# Patient Record
Sex: FEMALE | Race: BLACK OR AFRICAN AMERICAN | NOT HISPANIC OR LATINO | Employment: FULL TIME | URBAN - NONMETROPOLITAN AREA
[De-identification: names, ages, dates, MRNs, and addresses within clinical notes are randomized per-mention and may not be internally consistent; named-entity substitution may affect disease eponyms.]

---

## 2024-08-13 ENCOUNTER — HOSPITAL ENCOUNTER (OUTPATIENT)
Dept: GENERAL RADIOLOGY | Facility: HOSPITAL | Age: 39
Discharge: HOME | End: 2024-08-13
Admitting: NURSE PRACTITIONER
Payer: COMMERCIAL

## 2024-08-13 ENCOUNTER — OFFICE VISIT (OUTPATIENT)
Dept: UROLOGY | Facility: CLINIC | Age: 39
End: 2024-08-13
Payer: COMMERCIAL

## 2024-08-13 VITALS
WEIGHT: 134.6 LBS | DIASTOLIC BLOOD PRESSURE: 85 MMHG | HEIGHT: 64 IN | BODY MASS INDEX: 22.98 KG/M2 | HEART RATE: 98 BPM | SYSTOLIC BLOOD PRESSURE: 123 MMHG

## 2024-08-13 DIAGNOSIS — N20.1 LEFT URETERAL STONE: Primary | ICD-10-CM

## 2024-08-13 DIAGNOSIS — R10.9 LEFT FLANK PAIN: ICD-10-CM

## 2024-08-13 DIAGNOSIS — R35.0 FREQUENCY OF MICTURITION: ICD-10-CM

## 2024-08-13 DIAGNOSIS — R10.30 LOWER ABDOMINAL PAIN: ICD-10-CM

## 2024-08-13 DIAGNOSIS — N30.01 ACUTE CYSTITIS WITH HEMATURIA: ICD-10-CM

## 2024-08-13 LAB
BILIRUB BLD-MCNC: NEGATIVE MG/DL
CLARITY, POC: CLEAR
COLOR UR: YELLOW
EXPIRATION DATE: ABNORMAL
GLUCOSE UR STRIP-MCNC: NEGATIVE MG/DL
KETONES UR QL: NEGATIVE
LEUKOCYTE EST, POC: NEGATIVE
Lab: ABNORMAL
NITRITE UR-MCNC: NEGATIVE MG/ML
PH UR: 5.5 [PH] (ref 5–8)
PROT UR STRIP-MCNC: NEGATIVE MG/DL
RBC # UR STRIP: ABNORMAL /UL
SP GR UR: 1.01 (ref 1–1.03)
UROBILINOGEN UR QL: NORMAL

## 2024-08-13 PROCEDURE — 74018 RADEX ABDOMEN 1 VIEW: CPT | Performed by: RADIOLOGY

## 2024-08-13 PROCEDURE — 74018 RADEX ABDOMEN 1 VIEW: CPT

## 2024-08-13 PROCEDURE — 81003 URINALYSIS AUTO W/O SCOPE: CPT | Performed by: NURSE PRACTITIONER

## 2024-08-13 PROCEDURE — 99204 OFFICE O/P NEW MOD 45 MIN: CPT | Performed by: NURSE PRACTITIONER

## 2024-08-13 RX ORDER — NITROFURANTOIN 25; 75 MG/1; MG/1
100 CAPSULE ORAL 2 TIMES DAILY
Qty: 14 CAPSULE | Refills: 0 | Status: SHIPPED | OUTPATIENT
Start: 2024-08-13

## 2024-08-13 RX ORDER — KETOROLAC TROMETHAMINE 10 MG/1
10 TABLET, FILM COATED ORAL EVERY 6 HOURS PRN
Qty: 20 TABLET | Refills: 0 | Status: SHIPPED | OUTPATIENT
Start: 2024-08-13 | End: 2024-08-18

## 2024-08-13 RX ORDER — CEPHALEXIN 500 MG/1
1000 CAPSULE ORAL 4 TIMES DAILY
COMMUNITY
Start: 2024-08-09 | End: 2024-08-19

## 2024-08-13 RX ORDER — KETOROLAC TROMETHAMINE 10 MG/1
10 TABLET, FILM COATED ORAL EVERY 6 HOURS PRN
COMMUNITY
Start: 2024-08-09 | End: 2024-08-13 | Stop reason: SDUPTHER

## 2024-08-13 RX ORDER — PHENAZOPYRIDINE HYDROCHLORIDE 200 MG/1
200 TABLET, FILM COATED ORAL 3 TIMES DAILY PRN
Qty: 20 TABLET | Refills: 0 | Status: SHIPPED | OUTPATIENT
Start: 2024-08-13

## 2024-08-13 RX ORDER — TAMSULOSIN HYDROCHLORIDE 0.4 MG/1
0.4 CAPSULE ORAL DAILY
COMMUNITY
Start: 2024-08-09 | End: 2024-08-19

## 2024-08-13 NOTE — PROGRESS NOTES
Chief Complaint  Renal Stones (NEW PATIENT WITH LEFT URETERAL STONE/FLANK PAIN/ACUTE CYSTITIS)    Subjective          Brittany [place] presents to [facility] for  LEFT URETERAL STONE/FLANK PAIN/ACUTE CYSTITIS  History of Present Illness   History of Present Illness  The patient is a pleasant 39-year-old female with a significant history of recurrent kidney stones who presents to clinic today for evaluation as a new patient consult. The patient is also an ER follow-up for persistent left ureteral stone, acute cystitis with hematuria, flank and abdominal pain.     Patient reports she has had numerous ER visits dating from 06/17/2024 and most recently on 08/09/2024 presenting with left greater than right sided flank pain. The patient describes her pain as colliding in nature, 10/10. It had been intermittent initially but progressed to sharp, constant pain radiating to her right lower back, right lower quadrant abdomen causing significant pelvic pressure, suprapubic discomfort, urinary symptoms, frequency, urgency, and dysuria.  She is currently on antibiotic post ER visit, urinalysis is negative for leukocyte esterase, it is negative for nitrites, it is negative for gross but showed 3+ microscopic hematuria.    She had a recent CT completed 08/9 which I have reviewed showing A 4 mm distal left ureteral calculus, nearly to the left UVJ, causing mild upstream Hydroureteronephrosis.     On clinic evaluation today, she is still in apparent discomfort. However, the patient thinks she has passed a stone. She reports feeling better since she passed another stone on Friday. She was given 2 bags of fluid in the ER and urinated before leaving the ER. She is unsure if she passed the stone as she is not experiencing any pain. She has not seen any particles in her urine.     She denies any burning during urination, frequency, or urgency. She denies any nausea or vomiting. She is taking  "Flomax for frequent urination and urgency. She has had 3 kidney stones since 07/04/2024. The first stone on 07/04/2024 was 4mm, and she passed black particles on Friday. She passed a 3mm stone 4 weeks after that. She visited [facility] for 2 Mondays, the first time was 3 weeks after the 4th, and she was told she had a severe UTI going into her kidneys. She had a kidney stone in her right kidney 4 years ago. The ER doctor told her that the minerals in her water could have been the kidney stone. She has started drinking hot chocolate, but not every day like she used to. She drinks Sprite, lemonade, and cranberry juice. She has not eaten steak since 07/2024. She has not drank apple juice in 4 years.    FINDINGS: KUB 08/14/2024  1. View of the abdomen SHOW Moderate stool burden No evidence of bowel obstruction   IMPRESSION:Moderate stool burden.  Calcifications in the left hemipelvis    CT ABDOMEN/PELVIS 08/14/24      1.   A 4 mm distal left ureteral calculus, nearly to the left UVJ, causing mild upstream left hydroureteronephrosis.        2.   Nonspecific left gluteal subcutaneous soft tissue density, measuring 4.3 x 2.0 cm, with adjacent small foci of subcutaneous emphysema. Diagnostic consideration would include a hematoma or contusion if there is any recent clinical history of trauma. Consider follow-up to document resolution to exclude other etiologies   Active Ambulatory Problems     Diagnosis Date Noted    Left ureteral stone 08/13/2024    Left flank pain 08/13/2024    Frequency of micturition 08/13/2024    Lower abdominal pain 08/13/2024    Acute cystitis with hematuria 08/13/2024     Resolved Ambulatory Problems     Diagnosis Date Noted    No Resolved Ambulatory Problems     No Additional Past Medical History      Objective   Vital Signs:   /85 (BP Location: Left arm, Patient Position: Sitting, Cuff Size: Adult)   Pulse 98   Ht 162.6 cm (64\")   Wt 61.1 kg (134 lb 9.6 oz)   BMI 23.10 kg/m²       ROS: "   Review of Systems   Constitutional:  Positive for activity change, appetite change, fatigue and unexpected weight loss. Negative for chills, diaphoresis, fever and unexpected weight gain.   HENT: Negative.  Negative for congestion.    Eyes: Negative.  Negative for blurred vision, double vision, photophobia, pain, redness and visual disturbance.   Respiratory: Negative.  Negative for apnea, cough, chest tightness, shortness of breath and wheezing.    Cardiovascular: Negative.    Gastrointestinal:  Positive for abdominal pain, constipation and nausea. Negative for abdominal distention, diarrhea and vomiting.   Endocrine: Negative.  Negative for polydipsia, polyphagia and polyuria.   Genitourinary:  Positive for decreased urine volume, dysuria, flank pain, frequency, hematuria, pelvic pain, pelvic pressure and urgency. Negative for difficulty urinating, dyspareunia, genital sores, urinary incontinence and vaginal discharge.   Musculoskeletal:  Positive for myalgias. Negative for arthralgias, back pain and joint swelling.   Skin:  Positive for dry skin and pallor. Negative for rash and wound.   Allergic/Immunologic: Negative.    Neurological: Negative.  Negative for dizziness, headache, memory problem and confusion.   Hematological: Negative.    Psychiatric/Behavioral:  Positive for sleep disturbance and stress. Negative for behavioral problems and decreased concentration.         Physical Exam  Constitutional:       General: She is in acute distress.      Appearance: She is well-developed.   HENT:      Head: Normocephalic and atraumatic.   Eyes:      Pupils: Pupils are equal, round, and reactive to light.   Neck:      Thyroid: No thyromegaly.      Trachea: No tracheal deviation.   Cardiovascular:      Rate and Rhythm: Normal rate and regular rhythm.      Heart sounds: No murmur heard.  Pulmonary:      Effort: Pulmonary effort is normal. No respiratory distress.      Breath sounds: Normal breath sounds. No stridor. No  wheezing.   Abdominal:      General: Bowel sounds are normal.      Palpations: Abdomen is soft.      Tenderness: There is abdominal tenderness.   Genitourinary:     Labia:         Right: No tenderness.         Left: No tenderness.       Vagina: Normal. No vaginal discharge.      Comments: URINE FREQUENCY/MICROHEMATURIA  Musculoskeletal:         General: Tenderness (LEFT FLANK PAIN) present. No deformity. Normal range of motion.      Cervical back: Normal range of motion.   Skin:     General: Skin is warm and dry.      Coloration: Skin is not pale.      Findings: No erythema or rash.   Neurological:      Mental Status: She is alert and oriented to person, place, and time.      Cranial Nerves: No cranial nerve deficit.      Sensory: No sensory deficit.      Coordination: Coordination normal.   Psychiatric:         Behavior: Behavior normal.         Thought Content: Thought content normal.         Judgment: Judgment normal.        Physical Exam    Result Review :     UA          8/13/2024    08:55   Urinalysis   Ketones, UA Negative    Leukocytes, UA Negative               Assessment and Plan    Problem List Items Addressed This Visit          Gastrointestinal Abdominal     Left flank pain    Relevant Medications    phenazopyridine (Pyridium) 200 MG tablet    nitrofurantoin, macrocrystal-monohydrate, (Macrobid) 100 MG capsule    ketorolac (TORADOL) 10 MG tablet    Other Relevant Orders    XR abdomen kub (Completed)    Case Request (Completed)    Lower abdominal pain    Relevant Medications    phenazopyridine (Pyridium) 200 MG tablet    nitrofurantoin, macrocrystal-monohydrate, (Macrobid) 100 MG capsule    ketorolac (TORADOL) 10 MG tablet    Other Relevant Orders    XR abdomen kub (Completed)    Case Request (Completed)       Genitourinary and Reproductive     Left ureteral stone - Primary    Relevant Medications    phenazopyridine (Pyridium) 200 MG tablet    nitrofurantoin, macrocrystal-monohydrate, (Macrobid) 100 MG  capsule    ketorolac (TORADOL) 10 MG tablet    Other Relevant Orders    XR abdomen kub (Completed)    Case Request (Completed)    Frequency of micturition    Relevant Medications    phenazopyridine (Pyridium) 200 MG tablet    nitrofurantoin, macrocrystal-monohydrate, (Macrobid) 100 MG capsule    ketorolac (TORADOL) 10 MG tablet    Other Relevant Orders    POC Urinalysis Dipstick, Automated (Completed)    Case Request (Completed)    Acute cystitis with hematuria    Relevant Medications    tamsulosin (FLOMAX) 0.4 MG capsule 24 hr capsule    phenazopyridine (Pyridium) 200 MG tablet    nitrofurantoin, macrocrystal-monohydrate, (Macrobid) 100 MG capsule    ketorolac (TORADOL) 10 MG tablet    Other Relevant Orders    Case Request (Completed)       Assessment & Plan                                                   ASSESSMENT             LEFT 4MM UVJSTONE/BACK/FLANK PAIN/ACUTE CYSTITIS  MS JANELLE CAMARENA IS A 39year-old very pleasant patient, with a significant history of recurrent kidney stones, who presents to clinic today for evaluation, secondary to very sharp/colicky 10/10, aching and very consistent left flank pain radiating to HER LLQ of his abdomen and suprapubic region.  Initially she had urinary symptoms of frequency, urgency, hematuria and dysuria but this has since improved.     HER  CT scan/KUB showed a 5 mm left UVJ stone in situ.  However urine dipstick today is negative for leukocyte esterase, negative nitrates, She 3+ microscopic hematuria.   We have discussed the various parameters regarding spontaneous passage including the notion that a tiny 1 to 4 mm stone, has a 90% high likelihood of spontaneous passage versus a larger stone of greater than 5mm  like She has being caught up in the upper areas of the urinary tract. We also discussed the medical management of stone disease and the use of medical expulsive therapy in the form of Flomax. This is used in an off label setting. We discussed the various  therapeutic options available including percutaneous Nephrostolithotomy, lithotripsy, ESWL.  We discussed the indicators for intervention including  absolute indicators such as sepsis and uncontrollable severe pain as well as  the relative indicators of moderate pain that is well-controlled with various analgesia.  Patient is NOW desirous of surgical intervention.   PLAN     Patient has been scheduled for LEFT Ureteroscopy Laser Lithotripsy with possible Left Stent,  on Friday,08/23/2024 with Dr. Hull    Will resend her urine for culture, will call results if any positive bacterial growth not sensitive to her current therapy of Bactrim.    We discussed he repeat an x-ray on 08/22/24 during preop prior to procedure Friday 08/23 to evaluate kidney stone in situ.    Patient have adequate Narcotic pain medication oxycodone 7.5/10 mg given in ED pain management- Severe pain.    She will continue Toradol 10 mg as needed for moderate pain    We discussed OTHER treatment options for HER BACK/FLANK pain with patient encouraged to continue conservative therapy alternating NSAID/Tylenol as tolerated, Also including hot baths, showers, warm compresses to lower back as tolerated. Also encouraged walking, physical therapy, light exercises as tolerated    Rest is the most important treatment in the case of BACK/FLANK pain. Rest and physical therapy are enough to improve minor pain. Discussed to monitor his daily routine with prevention of flank pain by: At least Drinking 8 glasses of water per day, Limiting your alcohol consumption.  Having a healthy diet containing fruits, vegetables, and a lot of fluids, Practicing safe sex.  Also maintaining proper hygiene of your body as well as the environment.    Discussed a kidney stone prevention diet to include increasing p.o. fluid intake, to at least 1 to 2 L of water daily.  She is to avoid caffeine products such as cola, coffee, and to avoid soft or soda drinks.  She is to decrease  her sodium consumption as in  Fast foods, kiser, salted nuts, canned foods, and smoked/cured foods. She is also to decrease her oxalate consumption, as in spinach, Gonzalez greens, and Rhubarb.  Also important is to decrease protein intake, as in red meats, peanut butter, and also avoid nuts.    FOLLOW UP WITH GI-ABDOMINAL PAIN, IBS-C    Recommend bowel regimen with samples of Trulance given for trial-IBS-see    We will follow-up in clinic post procedure,    SHE  has been advised to return sooner if need be.    Go to ER if any worsening symptoms of fevers, chills consistent with sepsis    Patient/mom are agreeable to plan of care     1. Kidney stones/flank pain/acute cystitis-summary.  I will get an x-ray. Based on results of x-ray, definitive plan of care. I will discuss her kidney stone prevention diet which includes increasing her oral fluid intake at least 2 to 3 liters daily, decreasing sodium in her diet, decreasing oxalate. I also discussed her getting a little link for analysis and prevention of future kidney stones.    Patient reports that she is not currently experiencing any symptoms of urinary incontinence.    BMI is within normal parameters. No other follow-up for BMI required.    RADIOLOGY (CT AND/OR KUB):    CT Abdomen and Pelvis: No results found for this or any previous visit.     CT Stone Protocol: No results found for this or any previous visit.     KUB: No results found for this or any previous visit.       [unfilled]  LABS (3 MONTHS):    Office Visit on 08/13/2024   Component Date Value Ref Range Status    Color 08/13/2024 Yellow  Yellow, Straw, Dark Yellow, Miriam Final    Clarity, UA 08/13/2024 Clear  Clear Final    Specific Gravity  08/13/2024 1.010  1.005 - 1.030 Final    pH, Urine 08/13/2024 5.5  5.0 - 8.0 Final    Leukocytes 08/13/2024 Negative  Negative Final    Nitrite, UA 08/13/2024 Negative  Negative Final    Protein, POC 08/13/2024 Negative  Negative mg/dL Final    Glucose, UA  08/13/2024 Negative  Negative mg/dL Final    Ketones, UA 08/13/2024 Negative  Negative Final    Urobilinogen, UA 08/13/2024 Normal  Normal, 0.2 E.U./dL Final    Bilirubin 08/13/2024 Negative  Negative Final    Blood, UA 08/13/2024 3+ (A)  Negative Final    Lot Number 08/13/2024 [id number]   Final    Expiration Date 08/13/2024 10/25/2024   Final          Follow Up   Return in about 10 days (around 8/23/2024) for Next scheduled follow up, With Dr. Hull IN OR  FOR LEFT URETEROSCOPY LASER LITHOTRIPSY.    Patient was given instructions and counseling regarding her condition or for health maintenance advice. Please see specific information pulled into the AVS if appropriate.          This document has been electronically signed by Griselda Cheng-Akwa, APRN   August 15, 2024 18:38 EDT      Dictated Utilizing Dragon Dictation: Part of this note may be an electronic transcription/translation of spoken language to printed text using the Dragon Dictation System.      Patient or patient representative verbalized consent for the use of Ambient Listening during the visit with  Griselda Cheng-Akwa, APRN for chart documentation. 8/15/2024  18:38 EDT

## 2024-08-13 NOTE — H&P (VIEW-ONLY)
Chief Complaint  Renal Stones (NEW PATIENT WITH LEFT URETERAL STONE/FLANK PAIN/ACUTE CYSTITIS)    Subjective          Brittany Colorado presents to [facility] for  LEFT URETERAL STONE/FLANK PAIN/ACUTE CYSTITIS  History of Present Illness   History of Present Illness  The patient is a pleasant 39-year-old female with a significant history of recurrent kidney stones who presents to clinic today for evaluation as a new patient consult. The patient is also an ER follow-up for persistent left ureteral stone, acute cystitis with hematuria, flank and abdominal pain.     Patient reports she has had numerous ER visits dating from 06/17/2024 and most recently on 08/09/2024 presenting with left greater than right sided flank pain. The patient describes her pain as colliding in nature, 10/10. It had been intermittent initially but progressed to sharp, constant pain radiating to her right lower back, right lower quadrant abdomen causing significant pelvic pressure, suprapubic discomfort, urinary symptoms, frequency, urgency, and dysuria.  She is currently on antibiotic post ER visit, urinalysis is negative for leukocyte esterase, it is negative for nitrites, it is negative for gross but showed 3+ microscopic hematuria.    She had a recent CT completed 08/9 which I have reviewed showing A 4 mm distal left ureteral calculus, nearly to the left UVJ, causing mild upstream Hydroureteronephrosis.     On clinic evaluation today, she is still in apparent discomfort. However, the patient thinks she has passed a stone. She reports feeling better since she passed another stone on Friday. She was given 2 bags of fluid in the ER and urinated before leaving the ER. She is unsure if she passed the stone as she is not experiencing any pain. She has not seen any particles in her urine.     She denies any burning during urination, frequency, or urgency. She denies any nausea or vomiting. She is taking  "Flomax for frequent urination and urgency. She has had 3 kidney stones since 07/04/2024. The first stone on 07/04/2024 was 4mm, and she passed black particles on Friday. She passed a 3mm stone 4 weeks after that. She visited [facility] for 2 Mondays, the first time was 3 weeks after the 4th, and she was told she had a severe UTI going into her kidneys. She had a kidney stone in her right kidney 4 years ago. The ER doctor told her that the minerals in her water could have been the kidney stone. She has started drinking hot chocolate, but not every day like she used to. She drinks Sprite, lemonade, and cranberry juice. She has not eaten steak since 07/2024. She has not drank apple juice in 4 years.    FINDINGS: KUB 08/14/2024  1. View of the abdomen SHOW Moderate stool burden No evidence of bowel obstruction   IMPRESSION:Moderate stool burden.  Calcifications in the left hemipelvis    CT ABDOMEN/PELVIS 08/14/24      1.   A 4 mm distal left ureteral calculus, nearly to the left UVJ, causing mild upstream left hydroureteronephrosis.        2.   Nonspecific left gluteal subcutaneous soft tissue density, measuring 4.3 x 2.0 cm, with adjacent small foci of subcutaneous emphysema. Diagnostic consideration would include a hematoma or contusion if there is any recent clinical history of trauma. Consider follow-up to document resolution to exclude other etiologies   Active Ambulatory Problems     Diagnosis Date Noted    Left ureteral stone 08/13/2024    Left flank pain 08/13/2024    Frequency of micturition 08/13/2024    Lower abdominal pain 08/13/2024    Acute cystitis with hematuria 08/13/2024     Resolved Ambulatory Problems     Diagnosis Date Noted    No Resolved Ambulatory Problems     No Additional Past Medical History      Objective   Vital Signs:   /85 (BP Location: Left arm, Patient Position: Sitting, Cuff Size: Adult)   Pulse 98   Ht 162.6 cm (64\")   Wt 61.1 kg (134 lb 9.6 oz)   BMI 23.10 kg/m²       ROS: "   Review of Systems   Constitutional:  Positive for activity change, appetite change, fatigue and unexpected weight loss. Negative for chills, diaphoresis, fever and unexpected weight gain.   HENT: Negative.  Negative for congestion.    Eyes: Negative.  Negative for blurred vision, double vision, photophobia, pain, redness and visual disturbance.   Respiratory: Negative.  Negative for apnea, cough, chest tightness, shortness of breath and wheezing.    Cardiovascular: Negative.    Gastrointestinal:  Positive for abdominal pain, constipation and nausea. Negative for abdominal distention, diarrhea and vomiting.   Endocrine: Negative.  Negative for polydipsia, polyphagia and polyuria.   Genitourinary:  Positive for decreased urine volume, dysuria, flank pain, frequency, hematuria, pelvic pain, pelvic pressure and urgency. Negative for difficulty urinating, dyspareunia, genital sores, urinary incontinence and vaginal discharge.   Musculoskeletal:  Positive for myalgias. Negative for arthralgias, back pain and joint swelling.   Skin:  Positive for dry skin and pallor. Negative for rash and wound.   Allergic/Immunologic: Negative.    Neurological: Negative.  Negative for dizziness, headache, memory problem and confusion.   Hematological: Negative.    Psychiatric/Behavioral:  Positive for sleep disturbance and stress. Negative for behavioral problems and decreased concentration.         Physical Exam  Constitutional:       General: She is in acute distress.      Appearance: She is well-developed.   HENT:      Head: Normocephalic and atraumatic.   Eyes:      Pupils: Pupils are equal, round, and reactive to light.   Neck:      Thyroid: No thyromegaly.      Trachea: No tracheal deviation.   Cardiovascular:      Rate and Rhythm: Normal rate and regular rhythm.      Heart sounds: No murmur heard.  Pulmonary:      Effort: Pulmonary effort is normal. No respiratory distress.      Breath sounds: Normal breath sounds. No stridor. No  wheezing.   Abdominal:      General: Bowel sounds are normal.      Palpations: Abdomen is soft.      Tenderness: There is abdominal tenderness.   Genitourinary:     Labia:         Right: No tenderness.         Left: No tenderness.       Vagina: Normal. No vaginal discharge.      Comments: URINE FREQUENCY/MICROHEMATURIA  Musculoskeletal:         General: Tenderness (LEFT FLANK PAIN) present. No deformity. Normal range of motion.      Cervical back: Normal range of motion.   Skin:     General: Skin is warm and dry.      Coloration: Skin is not pale.      Findings: No erythema or rash.   Neurological:      Mental Status: She is alert and oriented to person, place, and time.      Cranial Nerves: No cranial nerve deficit.      Sensory: No sensory deficit.      Coordination: Coordination normal.   Psychiatric:         Behavior: Behavior normal.         Thought Content: Thought content normal.         Judgment: Judgment normal.        Physical Exam    Result Review :     UA          8/13/2024    08:55   Urinalysis   Ketones, UA Negative    Leukocytes, UA Negative               Assessment and Plan    Problem List Items Addressed This Visit          Gastrointestinal Abdominal     Left flank pain    Relevant Medications    phenazopyridine (Pyridium) 200 MG tablet    nitrofurantoin, macrocrystal-monohydrate, (Macrobid) 100 MG capsule    ketorolac (TORADOL) 10 MG tablet    Other Relevant Orders    XR abdomen kub (Completed)    Case Request (Completed)    Lower abdominal pain    Relevant Medications    phenazopyridine (Pyridium) 200 MG tablet    nitrofurantoin, macrocrystal-monohydrate, (Macrobid) 100 MG capsule    ketorolac (TORADOL) 10 MG tablet    Other Relevant Orders    XR abdomen kub (Completed)    Case Request (Completed)       Genitourinary and Reproductive     Left ureteral stone - Primary    Relevant Medications    phenazopyridine (Pyridium) 200 MG tablet    nitrofurantoin, macrocrystal-monohydrate, (Macrobid) 100 MG  capsule    ketorolac (TORADOL) 10 MG tablet    Other Relevant Orders    XR abdomen kub (Completed)    Case Request (Completed)    Frequency of micturition    Relevant Medications    phenazopyridine (Pyridium) 200 MG tablet    nitrofurantoin, macrocrystal-monohydrate, (Macrobid) 100 MG capsule    ketorolac (TORADOL) 10 MG tablet    Other Relevant Orders    POC Urinalysis Dipstick, Automated (Completed)    Case Request (Completed)    Acute cystitis with hematuria    Relevant Medications    tamsulosin (FLOMAX) 0.4 MG capsule 24 hr capsule    phenazopyridine (Pyridium) 200 MG tablet    nitrofurantoin, macrocrystal-monohydrate, (Macrobid) 100 MG capsule    ketorolac (TORADOL) 10 MG tablet    Other Relevant Orders    Case Request (Completed)       Assessment & Plan                                                   ASSESSMENT             LEFT 4MM UVJSTONE/BACK/FLANK PAIN/ACUTE CYSTITIS  MS JANELLE CAMARENA IS A 39year-old very pleasant patient, with a significant history of recurrent kidney stones, who presents to clinic today for evaluation, secondary to very sharp/colicky 10/10, aching and very consistent left flank pain radiating to HER LLQ of his abdomen and suprapubic region.  Initially she had urinary symptoms of frequency, urgency, hematuria and dysuria but this has since improved.     HER  CT scan/KUB showed a 5 mm left UVJ stone in situ.  However urine dipstick today is negative for leukocyte esterase, negative nitrates, She 3+ microscopic hematuria.   We have discussed the various parameters regarding spontaneous passage including the notion that a tiny 1 to 4 mm stone, has a 90% high likelihood of spontaneous passage versus a larger stone of greater than 5mm  like She has being caught up in the upper areas of the urinary tract. We also discussed the medical management of stone disease and the use of medical expulsive therapy in the form of Flomax. This is used in an off label setting. We discussed the various  Statement Selected therapeutic options available including percutaneous Nephrostolithotomy, lithotripsy, ESWL.  We discussed the indicators for intervention including  absolute indicators such as sepsis and uncontrollable severe pain as well as  the relative indicators of moderate pain that is well-controlled with various analgesia.  Patient is NOW desirous of surgical intervention.   PLAN     Patient has been scheduled for LEFT Ureteroscopy Laser Lithotripsy with possible Left Stent,  on Friday,08/23/2024 with Dr. Hull    Will resend her urine for culture, will call results if any positive bacterial growth not sensitive to her current therapy of Bactrim.    We discussed he repeat an x-ray on 08/22/24 during preop prior to procedure Friday 08/23 to evaluate kidney stone in situ.    Patient have adequate Narcotic pain medication oxycodone 7.5/10 mg given in ED pain management- Severe pain.    She will continue Toradol 10 mg as needed for moderate pain    We discussed OTHER treatment options for HER BACK/FLANK pain with patient encouraged to continue conservative therapy alternating NSAID/Tylenol as tolerated, Also including hot baths, showers, warm compresses to lower back as tolerated. Also encouraged walking, physical therapy, light exercises as tolerated    Rest is the most important treatment in the case of BACK/FLANK pain. Rest and physical therapy are enough to improve minor pain. Discussed to monitor his daily routine with prevention of flank pain by: At least Drinking 8 glasses of water per day, Limiting your alcohol consumption.  Having a healthy diet containing fruits, vegetables, and a lot of fluids, Practicing safe sex.  Also maintaining proper hygiene of your body as well as the environment.    Discussed a kidney stone prevention diet to include increasing p.o. fluid intake, to at least 1 to 2 L of water daily.  She is to avoid caffeine products such as cola, coffee, and to avoid soft or soda drinks.  She is to decrease  her sodium consumption as in  Fast foods, kiser, salted nuts, canned foods, and smoked/cured foods. She is also to decrease her oxalate consumption, as in spinach, Gonzalez greens, and Rhubarb.  Also important is to decrease protein intake, as in red meats, peanut butter, and also avoid nuts.    FOLLOW UP WITH GI-ABDOMINAL PAIN, IBS-C    Recommend bowel regimen with samples of Trulance given for trial-IBS-see    We will follow-up in clinic post procedure,    SHE  has been advised to return sooner if need be.    Go to ER if any worsening symptoms of fevers, chills consistent with sepsis    Patient/mom are agreeable to plan of care     1. Kidney stones/flank pain/acute cystitis-summary.  I will get an x-ray. Based on results of x-ray, definitive plan of care. I will discuss her kidney stone prevention diet which includes increasing her oral fluid intake at least 2 to 3 liters daily, decreasing sodium in her diet, decreasing oxalate. I also discussed her getting a little link for analysis and prevention of future kidney stones.    Patient reports that she is not currently experiencing any symptoms of urinary incontinence.    BMI is within normal parameters. No other follow-up for BMI required.    RADIOLOGY (CT AND/OR KUB):    CT Abdomen and Pelvis: No results found for this or any previous visit.     CT Stone Protocol: No results found for this or any previous visit.     KUB: No results found for this or any previous visit.       [unfilled]  LABS (3 MONTHS):    Office Visit on 08/13/2024   Component Date Value Ref Range Status    Color 08/13/2024 Yellow  Yellow, Straw, Dark Yellow, Miriam Final    Clarity, UA 08/13/2024 Clear  Clear Final    Specific Gravity  08/13/2024 1.010  1.005 - 1.030 Final    pH, Urine 08/13/2024 5.5  5.0 - 8.0 Final    Leukocytes 08/13/2024 Negative  Negative Final    Nitrite, UA 08/13/2024 Negative  Negative Final    Protein, POC 08/13/2024 Negative  Negative mg/dL Final    Glucose, UA  08/13/2024 Negative  Negative mg/dL Final    Ketones, UA 08/13/2024 Negative  Negative Final    Urobilinogen, UA 08/13/2024 Normal  Normal, 0.2 E.U./dL Final    Bilirubin 08/13/2024 Negative  Negative Final    Blood, UA 08/13/2024 3+ (A)  Negative Final    Lot Number 08/13/2024 [id number]   Final    Expiration Date 08/13/2024 10/25/2024   Final          Follow Up   Return in about 10 days (around 8/23/2024) for Next scheduled follow up, With Dr. Hull IN OR  FOR LEFT URETEROSCOPY LASER LITHOTRIPSY.    Patient was given instructions and counseling regarding her condition or for health maintenance advice. Please see specific information pulled into the AVS if appropriate.          This document has been electronically signed by Griselda Cheng-Akwa, APRN   August 15, 2024 18:38 EDT      Dictated Utilizing Dragon Dictation: Part of this note may be an electronic transcription/translation of spoken language to printed text using the Dragon Dictation System.      Patient or patient representative verbalized consent for the use of Ambient Listening during the visit with  Griselda Cheng-Akwa, APRN for chart documentation. 8/15/2024  18:38 EDT

## 2024-08-20 NOTE — DISCHARGE INSTRUCTIONS
8/23/24 ARRIVAL TIME PER DR OFFICE    HOLD all diabetic medications the morning of surgery as ordered by physician.    Please discontinue all blood thinners and anticoagulants (except aspirin) prior to surgery as per your surgeon and cardiologist instructions.  Aspirin may be continued up to the day prior to surgery.     CHLORHEXIDINE CLOTHS GIVEN WITH INSTRUCTIONS AND FORM TO RETURN TO HOSPITAL, IF APPLICABLE.    General Instructions:  Do not eat or drink after midnight:8/22/24 includes water, mints, or gum. You may brush your teeth.  Dental appliances that are removable must be taken out day of surgery.  Do not smoke, chew tobacco, or drink alcohol 24 hours prior to surgery.  Bring medications in original bottles, any inhalers and if applicable your C-PAP/BI-PAP machine.  Bring any papers given to you in the doctor's office.  Wear clean comfortable clothes and socks.  Do not wear contact lenses or make-up. Bring a case for your glasses if applicable.  Bring crutches or walker if applicable.  Leave all other valuables and jewelry at home.    If you were given a blood bank ID arm band remember to bring it with you the day of surgery.    Preventing a Surgical Site Infection:  Shower the night before surgery (unless instructed other wise) using a fresh bar of anti-bacterial soap (such as Dial) and clean washcloth. Dry with a clean towel and dress in clean clothing.  For 2 to 3 days before surgery, avoid shaving with a razor near where you will have surgery because the razor can irritate skin and make it easier to develop an infection. Ask your surgeon if you will be receiving antibiotics prior to surgery.  Make sure you, your family, and all healthcare providers clear their hands with soap and water or an alcohol-based hand  before caring for you or your wound.  If at all possible, quit smoking as many days before surgery as you can.    Day of surgery:  Upon arrival, a Pre-op nurse and Anesthesiologist will  review your health history, obtain vital signs, and answer questions you may have. The only belongings needed at this time will be your home medications and if applicable your C-PAP/BI-PAP machine. If you are staying overnight your family can leave the rest of your belongings in the car and bring them to your room later. A Pre-op nurse will start an IV and you may receive medication in preparation for surgery, including something to help you relax. Your family will be able to see you in the Pre-op area. While you are in surgery your family should notify the waiting room  if they leave the waiting room area and provide a contact phone number.    Please be aware that surgery does come with discomfort. We want to make every effort to control your discomfort so please discuss any uncontrolled symptoms with your nurse. Your doctor will most likely have prescribed pain medications.    If you are going home after surgery you will receive individualized written care instructions before being discharged. A responsible adult must drive you to and from the hospital on the day of surgery and stay with you for 24 hours.    If you are staying overnight following surgery, you will be transported to your hospital room following the recovery period.     If you have any questions please call Pre-Admission Testing at [phone] ext [phone] Monday-Friday 8:00-3:00  Deductibles and co-payments are collected on the day of service. Please be prepared to pay the required co-pay, deductible or deposit on the day of service as defined by your plan.    A RESPONSIBLE PERSON MUST REMAIN IN THE WAITING ROOM DURING YOUR PROCEDURE AND A RESPONSIBLE  MUST BE AVAILABLE UPON YOUR DISCHARGE.

## 2024-08-21 ENCOUNTER — PRE-ADMISSION TESTING (OUTPATIENT)
Dept: PREADMISSION TESTING | Facility: HOSPITAL | Age: 39
End: 2024-08-21
Payer: COMMERCIAL

## 2024-08-21 LAB
ANION GAP SERPL CALCULATED.3IONS-SCNC: 14.4 MMOL/L (ref 5–15)
BUN SERPL-MCNC: 18 MG/DL (ref 6–20)
BUN/CREAT SERPL: 28.1 (ref 7–25)
CALCIUM SPEC-SCNC: 10.1 MG/DL (ref 8.6–10.5)
CHLORIDE SERPL-SCNC: 103 MMOL/L (ref 98–107)
CO2 SERPL-SCNC: 22.6 MMOL/L (ref 22–29)
CREAT SERPL-MCNC: 0.64 MG/DL (ref 0.57–1)
DEPRECATED RDW RBC AUTO: 45.8 FL (ref 37–54)
EGFRCR SERPLBLD CKD-EPI 2021: 116.2 ML/MIN/1.73
ERYTHROCYTE [DISTWIDTH] IN BLOOD BY AUTOMATED COUNT: 14.2 % (ref 12.3–15.4)
GLUCOSE SERPL-MCNC: 91 MG/DL (ref 65–99)
HCT VFR BLD AUTO: 41.4 % (ref 34–46.6)
HGB BLD-MCNC: 13.4 G/DL (ref 12–15.9)
MCH RBC QN AUTO: 28.3 PG (ref 26.6–33)
MCHC RBC AUTO-ENTMCNC: 32.4 G/DL (ref 31.5–35.7)
MCV RBC AUTO: 87.5 FL (ref 79–97)
PLATELET # BLD AUTO: 215 10*3/MM3 (ref 140–450)
PMV BLD AUTO: 10.3 FL (ref 6–12)
POTASSIUM SERPL-SCNC: 3.6 MMOL/L (ref 3.5–5.2)
RBC # BLD AUTO: 4.73 10*6/MM3 (ref 3.77–5.28)
SODIUM SERPL-SCNC: 140 MMOL/L (ref 136–145)
WBC NRBC COR # BLD AUTO: 9.2 10*3/MM3 (ref 3.4–10.8)

## 2024-08-21 PROCEDURE — 85027 COMPLETE CBC AUTOMATED: CPT

## 2024-08-21 PROCEDURE — 80048 BASIC METABOLIC PNL TOTAL CA: CPT

## 2024-08-21 PROCEDURE — 36415 COLL VENOUS BLD VENIPUNCTURE: CPT

## 2024-08-21 RX ORDER — KETOROLAC TROMETHAMINE 10 MG/1
10 TABLET, FILM COATED ORAL EVERY 6 HOURS PRN
COMMUNITY

## 2024-08-21 RX ORDER — TAMSULOSIN HYDROCHLORIDE 0.4 MG/1
1 CAPSULE ORAL DAILY
COMMUNITY

## 2024-08-23 ENCOUNTER — APPOINTMENT (OUTPATIENT)
Dept: GENERAL RADIOLOGY | Facility: HOSPITAL | Age: 39
End: 2024-08-23
Payer: COMMERCIAL

## 2024-08-23 ENCOUNTER — ANESTHESIA (OUTPATIENT)
Dept: PERIOP | Facility: HOSPITAL | Age: 39
End: 2024-08-23
Payer: COMMERCIAL

## 2024-08-23 ENCOUNTER — HOSPITAL ENCOUNTER (OUTPATIENT)
Facility: HOSPITAL | Age: 39
Setting detail: HOSPITAL OUTPATIENT SURGERY
Discharge: HOME | End: 2024-08-23
Attending: UROLOGY | Admitting: UROLOGY
Payer: COMMERCIAL

## 2024-08-23 ENCOUNTER — ANESTHESIA EVENT (OUTPATIENT)
Dept: PERIOP | Facility: HOSPITAL | Age: 39
End: 2024-08-23
Payer: COMMERCIAL

## 2024-08-23 VITALS
TEMPERATURE: 97.2 F | BODY MASS INDEX: 21.85 KG/M2 | HEIGHT: 64 IN | SYSTOLIC BLOOD PRESSURE: 113 MMHG | OXYGEN SATURATION: 96 % | DIASTOLIC BLOOD PRESSURE: 82 MMHG | WEIGHT: 128 LBS | RESPIRATION RATE: 18 BRPM | HEART RATE: 71 BPM

## 2024-08-23 DIAGNOSIS — R10.30 LOWER ABDOMINAL PAIN: ICD-10-CM

## 2024-08-23 DIAGNOSIS — R10.9 LEFT FLANK PAIN: ICD-10-CM

## 2024-08-23 DIAGNOSIS — N30.01 ACUTE CYSTITIS WITH HEMATURIA: ICD-10-CM

## 2024-08-23 DIAGNOSIS — R35.0 FREQUENCY OF MICTURITION: ICD-10-CM

## 2024-08-23 DIAGNOSIS — N20.1 LEFT URETERAL STONE: ICD-10-CM

## 2024-08-23 LAB
B-HCG UR QL: NEGATIVE
EXPIRATION DATE: NORMAL
INTERNAL NEGATIVE CONTROL: NEGATIVE
INTERNAL POSITIVE CONTROL: POSITIVE
Lab: NORMAL

## 2024-08-23 PROCEDURE — 81025 URINE PREGNANCY TEST: CPT | Performed by: UROLOGY

## 2024-08-23 PROCEDURE — 74420 UROGRAPHY RTRGR +-KUB: CPT | Performed by: UROLOGY

## 2024-08-23 PROCEDURE — 25010000002 ONDANSETRON PER 1 MG: Performed by: NURSE ANESTHETIST, CERTIFIED REGISTERED

## 2024-08-23 PROCEDURE — 25510000001 IOPAMIDOL 61 % SOLUTION: Performed by: UROLOGY

## 2024-08-23 PROCEDURE — 74018 RADEX ABDOMEN 1 VIEW: CPT

## 2024-08-23 PROCEDURE — 74018 RADEX ABDOMEN 1 VIEW: CPT | Performed by: RADIOLOGY

## 2024-08-23 PROCEDURE — 25010000002 MIDAZOLAM PER 1 MG: Performed by: NURSE ANESTHETIST, CERTIFIED REGISTERED

## 2024-08-23 PROCEDURE — 25010000002 FENTANYL CITRATE (PF) 50 MCG/ML SOLUTION: Performed by: NURSE ANESTHETIST, CERTIFIED REGISTERED

## 2024-08-23 PROCEDURE — 52353 CYSTOURETERO W/LITHOTRIPSY: CPT | Performed by: UROLOGY

## 2024-08-23 PROCEDURE — 25010000002 PROPOFOL 200 MG/20ML EMULSION: Performed by: NURSE ANESTHETIST, CERTIFIED REGISTERED

## 2024-08-23 PROCEDURE — 25010000002 GENTAMICIN PER 80 MG: Performed by: UROLOGY

## 2024-08-23 PROCEDURE — 76000 FLUOROSCOPY <1 HR PHYS/QHP: CPT

## 2024-08-23 PROCEDURE — 76000 FLUOROSCOPY <1 HR PHYS/QHP: CPT | Performed by: RADIOLOGY

## 2024-08-23 RX ORDER — ONDANSETRON 2 MG/ML
INJECTION INTRAMUSCULAR; INTRAVENOUS AS NEEDED
Status: DISCONTINUED | OUTPATIENT
Start: 2024-08-23 | End: 2024-08-23 | Stop reason: SURG

## 2024-08-23 RX ORDER — MIDAZOLAM HYDROCHLORIDE 1 MG/ML
INJECTION INTRAMUSCULAR; INTRAVENOUS AS NEEDED
Status: DISCONTINUED | OUTPATIENT
Start: 2024-08-23 | End: 2024-08-23 | Stop reason: SURG

## 2024-08-23 RX ORDER — LIDOCAINE HYDROCHLORIDE 20 MG/ML
JELLY TOPICAL AS NEEDED
Status: DISCONTINUED | OUTPATIENT
Start: 2024-08-23 | End: 2024-08-23 | Stop reason: HOSPADM

## 2024-08-23 RX ORDER — MEPERIDINE HYDROCHLORIDE 25 MG/ML
12.5 INJECTION INTRAMUSCULAR; INTRAVENOUS; SUBCUTANEOUS
Status: DISCONTINUED | OUTPATIENT
Start: 2024-08-23 | End: 2024-08-23 | Stop reason: HOSPADM

## 2024-08-23 RX ORDER — FAMOTIDINE 10 MG/ML
INJECTION, SOLUTION INTRAVENOUS AS NEEDED
Status: DISCONTINUED | OUTPATIENT
Start: 2024-08-23 | End: 2024-08-23 | Stop reason: SURG

## 2024-08-23 RX ORDER — SODIUM CHLORIDE, SODIUM LACTATE, POTASSIUM CHLORIDE, CALCIUM CHLORIDE 600; 310; 30; 20 MG/100ML; MG/100ML; MG/100ML; MG/100ML
100 INJECTION, SOLUTION INTRAVENOUS ONCE AS NEEDED
Status: DISCONTINUED | OUTPATIENT
Start: 2024-08-23 | End: 2024-08-23 | Stop reason: HOSPADM

## 2024-08-23 RX ORDER — GENTAMICIN SULFATE 80 MG/100ML
80 INJECTION, SOLUTION INTRAVENOUS ONCE
Status: COMPLETED | OUTPATIENT
Start: 2024-08-23 | End: 2024-08-23

## 2024-08-23 RX ORDER — ONDANSETRON 2 MG/ML
4 INJECTION INTRAMUSCULAR; INTRAVENOUS AS NEEDED
Status: DISCONTINUED | OUTPATIENT
Start: 2024-08-23 | End: 2024-08-23 | Stop reason: HOSPADM

## 2024-08-23 RX ORDER — IPRATROPIUM BROMIDE AND ALBUTEROL SULFATE 2.5; .5 MG/3ML; MG/3ML
3 SOLUTION RESPIRATORY (INHALATION) ONCE AS NEEDED
Status: DISCONTINUED | OUTPATIENT
Start: 2024-08-23 | End: 2024-08-23 | Stop reason: HOSPADM

## 2024-08-23 RX ORDER — IOPAMIDOL 612 MG/ML
INJECTION, SOLUTION INTRAVASCULAR AS NEEDED
Status: DISCONTINUED | OUTPATIENT
Start: 2024-08-23 | End: 2024-08-23 | Stop reason: HOSPADM

## 2024-08-23 RX ORDER — PROPOFOL 10 MG/ML
INJECTION, EMULSION INTRAVENOUS AS NEEDED
Status: DISCONTINUED | OUTPATIENT
Start: 2024-08-23 | End: 2024-08-23 | Stop reason: SURG

## 2024-08-23 RX ORDER — LIDOCAINE HYDROCHLORIDE 20 MG/ML
INJECTION, SOLUTION EPIDURAL; INFILTRATION; INTRACAUDAL; PERINEURAL AS NEEDED
Status: DISCONTINUED | OUTPATIENT
Start: 2024-08-23 | End: 2024-08-23 | Stop reason: SURG

## 2024-08-23 RX ORDER — OXYCODONE AND ACETAMINOPHEN 5; 325 MG/1; MG/1
1 TABLET ORAL ONCE AS NEEDED
Status: DISCONTINUED | OUTPATIENT
Start: 2024-08-23 | End: 2024-08-23 | Stop reason: HOSPADM

## 2024-08-23 RX ORDER — FENTANYL CITRATE 50 UG/ML
INJECTION, SOLUTION INTRAMUSCULAR; INTRAVENOUS AS NEEDED
Status: DISCONTINUED | OUTPATIENT
Start: 2024-08-23 | End: 2024-08-23 | Stop reason: SURG

## 2024-08-23 RX ORDER — FENTANYL CITRATE 50 UG/ML
50 INJECTION, SOLUTION INTRAMUSCULAR; INTRAVENOUS
Status: DISCONTINUED | OUTPATIENT
Start: 2024-08-23 | End: 2024-08-23 | Stop reason: HOSPADM

## 2024-08-23 RX ORDER — HYDROCODONE BITARTRATE AND ACETAMINOPHEN 10; 325 MG/1; MG/1
1 TABLET ORAL EVERY 6 HOURS PRN
Qty: 12 TABLET | Refills: 0 | Status: SHIPPED | OUTPATIENT
Start: 2024-08-23

## 2024-08-23 RX ADMIN — FAMOTIDINE 20 MG: 10 INJECTION, SOLUTION INTRAVENOUS at 15:30

## 2024-08-23 RX ADMIN — GENTAMICIN SULFATE 80 MG: 80 INJECTION, SOLUTION INTRAVENOUS at 15:30

## 2024-08-23 RX ADMIN — MIDAZOLAM HYDROCHLORIDE 2 MG: 1 INJECTION, SOLUTION INTRAMUSCULAR; INTRAVENOUS at 15:30

## 2024-08-23 RX ADMIN — LIDOCAINE HYDROCHLORIDE 3 ML: 20 INJECTION, SOLUTION EPIDURAL; INFILTRATION; INTRACAUDAL; PERINEURAL at 15:30

## 2024-08-23 RX ADMIN — ONDANSETRON 4 MG: 2 INJECTION INTRAMUSCULAR; INTRAVENOUS at 15:30

## 2024-08-23 RX ADMIN — PROPOFOL 150 MG: 10 INJECTION, EMULSION INTRAVENOUS at 15:32

## 2024-08-23 RX ADMIN — FENTANYL CITRATE 100 MCG: 50 INJECTION INTRAMUSCULAR; INTRAVENOUS at 15:30

## 2024-08-23 NOTE — ANESTHESIA POSTPROCEDURE EVALUATION
Patient: Brittany Colorado    Procedure Summary       Date: 08/23/24 Room / Location: [facility] OR 06 /  [facility] OR    Anesthesia Start: 1530 Anesthesia Stop: 1555    Procedure: URETEROSCOPY LASER LITHOTRIPSY WITH RETROGRADE PYELOGRAM (Left) Diagnosis:       Left ureteral stone      Left flank pain      Frequency of micturition      Lower abdominal pain      Acute cystitis with hematuria      (Left ureteral stone [N20.1])      (Left flank pain [R10.9])      (Frequency of micturition [R35.0])      (Lower abdominal pain [R10.30])      (Acute cystitis with hematuria [N30.01])    Surgeons: José Hull MD Provider: Jose Juan Lee MD    Anesthesia Type: general ASA Status: 2            Anesthesia Type: general    Vitals  Vitals Value Taken Time   /72 08/23/24 1601   Temp 97.5 °F (36.4 °C) 08/23/24 1556   Pulse 70 08/23/24 1602   Resp 16 08/23/24 1601   SpO2 99 % 08/23/24 1602   Vitals shown include unfiled device data.        Post Anesthesia Care and Evaluation    Patient location during evaluation: PACU  Patient participation: complete - patient participated  Level of consciousness: awake  Pain score: 0  Pain management: satisfactory to patient    Airway patency: patent  Anesthetic complications: No anesthetic complications  PONV Status: none  Cardiovascular status: hemodynamically stable  Respiratory status: nasal cannula  Hydration status: acceptable

## 2024-08-23 NOTE — ANESTHESIA PREPROCEDURE EVALUATION
Anesthesia Evaluation     Patient summary reviewed and Nursing notes reviewed   no history of anesthetic complications:   NPO Solid Status: > 8 hours  NPO Liquid Status: > 8 hours           Airway   Mallampati: II  TM distance: >3 FB  Neck ROM: full  Dental - normal exam         Pulmonary - negative pulmonary ROS    breath sounds clear to auscultation  Cardiovascular   Exercise tolerance: good (4-7 METS)    Rhythm: regular  Rate: normal        Neuro/Psych- negative ROS  GI/Hepatic/Renal/Endo    (+) renal disease- stones    Musculoskeletal     (+) neck pain  Abdominal     Abdomen: soft.   Substance History - negative use     OB/GYN negative ob/gyn ROS         Other - negative ROS                   Anesthesia Plan    ASA 2     general     intravenous induction     Anesthetic plan, risks, benefits, and alternatives have been provided, discussed and informed consent has been obtained with: patient.  Pre-procedure education provided  Use of blood products discussed with  Consented to blood products.    Plan discussed with CRNA.    CODE STATUS:

## 2024-08-23 NOTE — OP NOTE
Brittany Colorado  8/23/2024    Pre-op Diagnosis:   Left ureteral stone [N20.1]  Left flank pain [R10.9]  Frequency of micturition [R35.0]  Lower abdominal pain [R10.30]  Acute cystitis with hematuria [N30.01]    Post-op Diagnosis:     Post-Op Diagnosis Codes:     * Left ureteral stone [N20.1]     * Left flank pain [R10.9]     * Frequency of micturition [R35.0]     * Lower abdominal pain [R10.30]     * Acute cystitis with hematuria [N30.01]    Procedure/CPT® Codes:  Three 9-year-old female with a left distal stone for ureteroscopy it is not past causing pain following an informed consent brought the procedure suite prepped and draped in a low dorsolithotomy position I utilized the Toribio 4.5 [place] ureteroscope easily identified the left orifice past the dome identified a calcium oxalate dihydrate stone I broke it numerous pieces and extracted each piece sequentially retrograde showed good free flow the better no complications were encountered the procedure was tolerated well she was awakened and returned to recovery    Procedure(s):  URETEROSCOPY  LASER LITHOTRIPSY   RETROGRADE PYELOGRAM  Fluoroscopy less than 30 minutes  Surgeon(s):  José Hull MD    Anesthesia: see anesthesia record    Staff:   Circulator: Yesenia Sanchez RN  Scrub Person: Ping Pedersen LPN; Chelly Barker    Estimated Blood Loss: none  Urine Voided: * No values recorded between 8/23/2024  3:29 PM and 8/23/2024  3:49 PM *    Specimens:                None      Drains: None    Findings: Great showed good free flow into the bladder no extravasation    Blood: N/A    Complications: None    Grafts and Implants: None    José Hull MD     Date: 8/23/2024  Time: 15:52 EDT

## 2024-08-23 NOTE — ANESTHESIA PROCEDURE NOTES
Airway  Urgency: elective    Date/Time: 8/23/2024 3:33 PM  End Time:8/23/2024 3:33 PM    General Information and Staff    Patient location during procedure: OR  CRNA/CAA: Sumit Ramírez CRNA    Indications and Patient Condition  Indications for airway management: airway protection    Preoxygenated: yes  MILS maintained throughout  Mask difficulty assessment: 0 - not attempted    Final Airway Details  Final airway type: supraglottic airway      Successful airway: unique  Size 3  Airway Seal Pressure (cm H2O): 20     Number of attempts at approach: 1  Assessment: lips, teeth, and gum same as pre-op and atraumatic intubation    Additional Comments  Atraumatic

## 2024-08-28 ENCOUNTER — OFFICE VISIT (OUTPATIENT)
Dept: UROLOGY | Facility: CLINIC | Age: 39
End: 2024-08-28
Payer: COMMERCIAL

## 2024-08-28 VITALS
DIASTOLIC BLOOD PRESSURE: 73 MMHG | HEIGHT: 64 IN | SYSTOLIC BLOOD PRESSURE: 116 MMHG | BODY MASS INDEX: 22.2 KG/M2 | WEIGHT: 130 LBS | HEART RATE: 80 BPM

## 2024-08-28 DIAGNOSIS — N20.1 LEFT URETERAL STONE: Primary | ICD-10-CM

## 2024-08-28 PROBLEM — R10.9 LEFT FLANK PAIN: Status: RESOLVED | Noted: 2024-08-13 | Resolved: 2024-08-28

## 2024-08-28 NOTE — PROGRESS NOTES
"Chief Complaint:    Chief Complaint   Patient presents with    Left ureteral stone       Vital Signs:   /73   Pulse 80   Ht 162.6 cm (64.02\")   Wt 59 kg (130 lb)   BMI 22.30 kg/m²   Body mass index is 22.3 kg/m².      HPI:  Brittany Colorado is a 39 y.o. female who presents today for follow up    History of Present Illness  Ms. Colorado returns to the clinic today for nephrolithiasis.  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  She underwent a left uteroscopy with holmium laser lithotripsy and retrograde pyelogram by Dr. Hull on 8/23/2024.  I did review the patient's previous CT scans that showed only 1 other nephrolithiasis that was punctate and nonobstructing in the right kidney.  Recent x-ray completed at time of surgery on 8/23/2024 confirmed her distal left ureteral stone with no other calcifications noted on x-ray.  Surgery was uneventful and Dr. Hull was able to remove her calcium oxalate dihydrate stone and extracted each pieces eventually.  He did a careful retrograde showing good free flow.  She returns today and states she is doing much better.  She has changed her diet significantly and did report an excessive intake of dark sweet tea, nuts, and Dr. Pepper.  She has no symptoms and is doing well.      Past Medical History:  Past Medical History:   Diagnosis Date    Anemia     Kidney stones     Neck pain     PT REPORTS SPURS       Current Meds:  Current Outpatient Medications   Medication Sig Dispense Refill    HYDROcodone-acetaminophen (NORCO)  MG per tablet Take 1 tablet by mouth Every 6 (Six) Hours As Needed for Moderate Pain. 12 tablet 0    ketorolac (TORADOL) 10 MG tablet Take 1 tablet by mouth Every 6 (Six) Hours As Needed for Moderate Pain.      nitrofurantoin, macrocrystal-monohydrate, (Macrobid) 100 MG capsule Take 1 capsule by mouth 2 (Two) Times a Day. 14 capsule 0    phenazopyridine (Pyridium) 200 MG " tablet Take 1 tablet by mouth 3 (Three) Times a Day As Needed for Bladder Spasms. 20 tablet 0    tamsulosin (FLOMAX) 0.4 MG capsule 24 hr capsule Take 1 capsule by mouth Daily.       No current facility-administered medications for this visit.        Allergies:   Allergies   Allergen Reactions    Erythromycin Base Swelling    Ibuprofen Swelling    Sulfamethoxazole-Trimethoprim Nausea And Vomiting    Clindamycin Itching, Nausea And Vomiting and Unknown (See Comments)    Varenicline Itching, Rash and Unknown (See Comments)        Past Surgical History:  Past Surgical History:   Procedure Laterality Date    CERVIX SURGERY      cone    SALPINGECTOMY Right     URETEROSCOPY LASER LITHOTRIPSY WITH STENT INSERTION Left 8/23/2024    Procedure: URETEROSCOPY LASER LITHOTRIPSY WITH RETROGRADE PYELOGRAM;  Surgeon: José Hull MD;  Location: [facility];  Service: Urology;  Laterality: Left;       Social History:  Social History     Socioeconomic History    Marital status: Single   Tobacco Use    Smoking status: Every Day     Current packs/day: 1.00     Types: Cigarettes    Smokeless tobacco: Never   Vaping Use    Vaping status: Never Used   Substance and Sexual Activity    Alcohol use: Defer    Drug use: Defer    Sexual activity: Defer       Family History:  History reviewed. No pertinent family history.    Review of Systems:  Review of Systems   Constitutional:  Negative for fatigue, fever and unexpected weight change.   Respiratory:  Negative for chest tightness and shortness of breath.    Cardiovascular:  Negative for chest pain.   Gastrointestinal:  Negative for abdominal pain, constipation, diarrhea, nausea and vomiting.   Genitourinary:  Negative for difficulty urinating, dysuria, flank pain, frequency and urgency.   Musculoskeletal:  Negative for back pain.   Skin:  Negative for rash.   Psychiatric/Behavioral:  Negative for confusion and suicidal ideas.        Physical Exam:  Physical Exam  Constitutional:        General: She is not in acute distress.     Appearance: Normal appearance.   HENT:      Head: Normocephalic and atraumatic.      Nose: Nose normal.      Mouth/Throat:      Mouth: Mucous membranes are moist.   Eyes:      Conjunctiva/sclera: Conjunctivae normal.   Cardiovascular:      Rate and Rhythm: Normal rate and regular rhythm.      Pulses: Normal pulses.      Heart sounds: Normal heart sounds.   Pulmonary:      Effort: Pulmonary effort is normal.      Breath sounds: Normal breath sounds.   Abdominal:      General: Bowel sounds are normal.      Palpations: Abdomen is soft.   Musculoskeletal:         General: Normal range of motion.      Cervical back: Normal range of motion.   Skin:     General: Skin is warm.   Neurological:      General: No focal deficit present.      Mental Status: She is alert and oriented to person, place, and time.   Psychiatric:         Mood and Affect: Mood normal.         Behavior: Behavior normal.         Thought Content: Thought content normal.         Judgment: Judgment normal.             Recent Image (CT and/or KUB):   CT Abdomen and Pelvis: No results found for this or any previous visit.     CT Stone Protocol: No results found for this or any previous visit.     KUB: Results for orders placed during the hospital encounter of 08/23/24    XR Abdomen KUB    Narrative  PROCEDURE: XR ABDOMEN KUB-    HISTORY: stone    COMPARISON: 8/13/2024.    FINDINGS: An AP view of the abdomen and pelvis demonstrates an  unremarkable bowel gas pattern with no evidence of obstruction. No  radiopaque stones are seen overlying the renal shadows. There are stable  calcifications within the pelvis. There is a 3-4 mm calcification in the  region of the distal left ureter    Impression  Stable pelvic calcifications, 1 of which measures 3-4 mm and  is in the region of the distal left ureter.          This report was finalized on 8/23/2024 1:33 PM by Ericka Ramsey M.D..       Labs:  Brief Urine Lab Results   (Last result in the past 365 days)        Color   Clarity   Blood   Leuk Est   Nitrite   Protein   CREAT   Urine HCG        08/23/24 1324               Negative             Admission on 08/23/2024, Discharged on 08/23/2024   Component Date Value Ref Range Status    HCG, Urine, QL 08/23/2024 Negative  Negative Final    Lot Number 08/23/2024 [id number]   Final    Internal Positive Control 08/23/2024 Positive  Positive, Passed Final    Internal Negative Control 08/23/2024 Negative  Negative, Passed Final    Expiration Date 08/23/2024 2,162,026   Final   Pre-Admission Testing on 08/21/2024   Component Date Value Ref Range Status    Glucose 08/21/2024 91  65 - 99 mg/dL Final    BUN 08/21/2024 18  6 - 20 mg/dL Final    Creatinine 08/21/2024 0.64  0.57 - 1.00 mg/dL Final    Sodium 08/21/2024 140  136 - 145 mmol/L Final    Potassium 08/21/2024 3.6  3.5 - 5.2 mmol/L Final    Chloride 08/21/2024 103  98 - 107 mmol/L Final    CO2 08/21/2024 22.6  22.0 - 29.0 mmol/L Final    Calcium 08/21/2024 10.1  8.6 - 10.5 mg/dL Final    BUN/Creatinine Ratio 08/21/2024 28.1 (H)  7.0 - 25.0 Final    Anion Gap 08/21/2024 14.4  5.0 - 15.0 mmol/L Final    eGFR 08/21/2024 116.2  >60.0 mL/min/1.73 Final    WBC 08/21/2024 9.20  3.40 - 10.80 10*3/mm3 Final    RBC 08/21/2024 4.73  3.77 - 5.28 10*6/mm3 Final    Hemoglobin 08/21/2024 13.4  12.0 - 15.9 g/dL Final    Hematocrit 08/21/2024 41.4  34.0 - 46.6 % Final    MCV 08/21/2024 87.5  79.0 - 97.0 fL Final    MCH 08/21/2024 28.3  26.6 - 33.0 pg Final    MCHC 08/21/2024 32.4  31.5 - 35.7 g/dL Final    RDW 08/21/2024 14.2  12.3 - 15.4 % Final    RDW-SD 08/21/2024 45.8  37.0 - 54.0 fl Final    MPV 08/21/2024 10.3  6.0 - 12.0 fL Final    Platelets 08/21/2024 215  140 - 450 10*3/mm3 Final   Office Visit on 08/13/2024   Component Date Value Ref Range Status    Color 08/13/2024 Yellow  Yellow, Straw, Dark Yellow, Miriam Final    Clarity, UA 08/13/2024 Clear  Clear Final    Specific Gravity  08/13/2024 1.010   1.005 - 1.030 Final    pH, Urine 08/13/2024 5.5  5.0 - 8.0 Final    Leukocytes 08/13/2024 Negative  Negative Final    Nitrite, UA 08/13/2024 Negative  Negative Final    Protein, POC 08/13/2024 Negative  Negative mg/dL Final    Glucose, UA 08/13/2024 Negative  Negative mg/dL Final    Ketones, UA 08/13/2024 Negative  Negative Final    Urobilinogen, UA 08/13/2024 Normal  Normal, 0.2 E.U./dL Final    Bilirubin 08/13/2024 Negative  Negative Final    Blood, UA 08/13/2024 3+ (A)  Negative Final    Lot Number 08/13/2024 [id number]   Final    Expiration Date 08/13/2024 10/25/2024   Final        Procedure: None  Procedures     I have reviewed and agree with the above PMH, PSH, FMH, social history, medications, allergies, and labs.     Assessment/Plan:   Problem List Items Addressed This Visit       RESOLVED: Left ureteral stone - Primary       Health Maintenance:   Health Maintenance Due   Topic Date Due    Pneumococcal Vaccine 0-64 (1 of 2 - PCV) Never done    TDAP/TD VACCINES (1 - Tdap) Never done    COVID-19 Vaccine (3 - 2023-24 season) 09/01/2023    HEPATITIS C SCREENING  Never done    ANNUAL PHYSICAL  Never done    PAP SMEAR  Never done    INFLUENZA VACCINE  08/01/2024        Smoking Counseling: Everyday smoker.  Never used smokeless tobacco.  Counseling given however not ready to quit at this time.    Urine Incontinence: Patient reports that she is not currently experiencing any symptoms of urinary incontinence.    Patient was given instructions and counseling regarding her condition or for health maintenance advice. Please see specific information pulled into the AVS if appropriate.    Patient Education:   Left ureteral stone -patient is post left ureteroscopy with holmium laser lithotripsy and stone extraction.  Retrograde showed good excavation with no concerns of obstruction.  She is asymptomatic at this time and doing significantly better.  Patient is changing her diet appropriately and I recommended to  increase water intake to 2 to 3 L/day.  Will see her back as needed.    Visit Diagnoses:    ICD-10-CM ICD-9-CM   1. Left ureteral stone  N20.1 592.1     A total of 20 minutes were spent coordinating this patient’s care in clinic today; 12 minutes of which were face-to-face with the patient, reviewing medical history and counseling on the current treatment and followup plan.  All questions were answered to patient's satisfaction.    Meds Ordered During Visit:  No orders of the defined types were placed in this encounter.      Follow Up Appointment: As needed  No follow-ups on file.      This document has been electronically signed by Jovani Mast PA-C   August 28, 2024 09:52 EDT    Part of this note may be an electronic transcription/translation of spoken language to printed text using the Dragon Dictation System.

## (undated) DEVICE — FIBR LASR FLEXIVAPULSE365 HOLMIUM FLT/TP 1P/U

## (undated) DEVICE — COR CYSTO: Brand: MEDLINE INDUSTRIES, INC.

## (undated) DEVICE — NITINOL STONE RETRIEVAL BASKET: Brand: ZERO TIP